# Patient Record
Sex: FEMALE | NOT HISPANIC OR LATINO | Employment: FULL TIME | URBAN - METROPOLITAN AREA
[De-identification: names, ages, dates, MRNs, and addresses within clinical notes are randomized per-mention and may not be internally consistent; named-entity substitution may affect disease eponyms.]

---

## 2024-06-12 ENCOUNTER — TRANSFERRED RECORDS (OUTPATIENT)
Dept: HEALTH INFORMATION MANAGEMENT | Facility: CLINIC | Age: 44
End: 2024-06-12

## 2024-06-27 ENCOUNTER — TRANSCRIBE ORDERS (OUTPATIENT)
Dept: OTHER | Age: 44
End: 2024-06-27

## 2024-06-27 DIAGNOSIS — H47.099 DISORDER OF OPTIC NERVE, UNSPECIFIED LATERALITY: Primary | ICD-10-CM

## 2024-08-06 NOTE — PROGRESS NOTES
1. Right superior oblique palsy, congenital:     We discussed options including observation, prisms, and strabismus surgery. We also discussed that strabismus surgery is not a guarantee to improve her neck arthritis or migraines even if elimination of diplopia is achieved.     Discussed risks, benefits, alternatives of elective strabismus surgery and she elected to pursue strabismus surgery.  Given she has tried nearly 10 different prism glasses I have low suspicion another prescription will work.     2. 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  Normal visual function in the left eye with 20/20 visual acuity and normal Ishihara color plates AND no afferent pupillary defect in the left eye.  May consider formal perimetry at future visit.    3. 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 Bee for review.     Leonora Mauricio is a pleasant 44 year old  female who presents to my neuro-ophthalmology clinic today having been referred by Dr. Bee for diplopia.    HPI:  Patient is a 44-year-old female with a chief complaint of binocular diplopia.     She has had diplopia for as long as she can remember. Did not see an eye doctor until college therefore did not use glasses, prisms, or surgery for the diplopia. She has compensated by adopting a left head tilt and suffered neck arthritis at age 37.     She has history of migraines a couple of times a month, with the migraine she has light sensitivity.  She also gets dizzy has nausea some balance issues with migraine.     Double vision happens in the afternoon when she is trying to read when her eyes are tired.  Has been treated with prism glasses. She's had many many prism glasses but never found a pair that have worked great as she will develop migraines with the prism glasses. Her migraines are mainly headache and nausea. The  second image is always up and to the right.     She been told she has an abnormal blood vessel in the left eye that was followed with serial scans/images.     She is here today to discuss options including surgery mainly to try to improve migraines and neck arthritis.     She thinks she is able to see 3D movies.     She also reports the left eye peripheral visual field has been documented as restricted but has not noticed a change. This is longstanding.     Independent historians:  Patient    Review of outside testing:  None    My interpretation performed today of outside testing:  None     Review of outside clinical notes:    6/27/24  Visit with Dr. Bee            Past medical history:  Migraines, anxiety, depression    Patient has a current medication list which includes the following prescription(s): citalopram.. Imitrex. List is confirmed today.    Family history / social history:  Patient's family history is not on file.     Exam:  VA 20/20 each eye, pupils equal and reactive no APD, intraocular pressure 12 right eye and 13 left eye, color 11/11 each eye, anterior segment exam unremarkable, fundus exam with peripapillary loop left eye.     Tests ordered and interpreted today:  Sensorimotor exam showed a relatively concomitant pattern right hypertropia in all gaze positions at distance. Minimal torsion on double walker roger. No significant right inferior oblique overaction.     We discussed in detail the risks, benefits, and alternatives of eye muscle correction surgery including the very rare risk of death or serious morbidity from a general anesthesia complication and the rare risk of severe vision loss in the operative eye(s) secondary to retinal detachment or endophthalmitis.  We discussed more likely sub-optimal outcomes including the unanticipated need for additional strabismus surgery.  Finally the patient was aware that prisms glasses may be required to optimize single vision following surgery.  Patient  aware that it is possible we will only reduce her dependency on prism but still need some for optimal fusion.    After a thorough discussion of these risks, the patient decided to proceed with strabismus surgery.  The surgical plan is as follows:     1. Bilateral eye muscle correction.    Fixed suture technique    Follow-up 1 week after strabismus surgery.    [facility]  Prism free glasses for adjustment: Non-adjustable    Patient wishes for surgery in January.       Vinod Kovacs MD  Resident Physician, PGY-3  Department of Ophthalmology    Precharting:  Johnnie Piedra MD   Fellow, Neuro-Ophthalmology    45 minutes were spent on the date of the encounter by me doing chart review, history and exam, documentation, and further activities as noted above    Complete documentation of historical and exam elements from today's encounter can be found in the full encounter summary report (not reduplicated in this progress note).  I personally obtained the chief complaint(s) and history of present illness.  I confirmed and edited as necessary the review of systems, past medical/surgical history, family history, social history, and examination findings as documented by others; and I examined the patient myself.  I personally reviewed the relevant tests, images, and reports as documented above.  I formulated and edited as necessary the assessment and plan and discussed the findings and management plan with the patient and family.  I personally reviewed the ophthalmic test(s) associated with this encounter, agree with the interpretation(s) as documented by the resident/fellow, and have edited the corresponding report(s) as necessary.     Bakari Rodrigez MD

## 2024-08-07 ENCOUNTER — OFFICE VISIT (OUTPATIENT)
Dept: OPHTHALMOLOGY | Facility: CLINIC | Age: 44
End: 2024-08-07
Attending: OPTOMETRIST
Payer: COMMERCIAL

## 2024-08-07 DIAGNOSIS — H53.10 SUBJECTIVE VISUAL DISTURBANCE: ICD-10-CM

## 2024-08-07 DIAGNOSIS — H50.21 HYPERTROPIA OF RIGHT EYE: ICD-10-CM

## 2024-08-07 DIAGNOSIS — H47.099 DISORDER OF OPTIC NERVE, UNSPECIFIED LATERALITY: ICD-10-CM

## 2024-08-07 DIAGNOSIS — H53.2 DOUBLE VISION: Primary | ICD-10-CM

## 2024-08-07 PROCEDURE — 99214 OFFICE O/P EST MOD 30 MIN: CPT | Performed by: OPHTHALMOLOGY

## 2024-08-07 PROCEDURE — 92060 SENSORIMOTOR EXAMINATION: CPT | Performed by: OPHTHALMOLOGY

## 2024-08-07 PROCEDURE — 92060 SENSORIMOTOR EXAMINATION: CPT

## 2024-08-07 PROCEDURE — 99204 OFFICE O/P NEW MOD 45 MIN: CPT | Performed by: OPHTHALMOLOGY

## 2024-08-07 PROCEDURE — 92060 SENSORIMOTOR EXAMINATION: CPT | Mod: 26 | Performed by: OPHTHALMOLOGY

## 2024-08-07 RX ORDER — CITALOPRAM HYDROBROMIDE 40 MG/1
40 TABLET ORAL EVERY MORNING
COMMUNITY

## 2024-08-07 ASSESSMENT — VISUAL ACUITY
METHOD: SNELLEN - LINEAR
OD_SC: 20/20
OS_SC: 20/20

## 2024-08-07 ASSESSMENT — REFRACTION_WEARINGRX
OD_VPRISM: 3.0
OD_HPRISM: 6.0
OS_CYLINDER: SPHERE
OD_SPHERE: -0.25
OS_VBASE: UP
OS_HBASE: IN
OD_HBASE: IN
OS_VPRISM: 1.0
OD_VBASE: DOWN
OS_SPHERE: -0.50
OD_CYLINDER: SPHERE
OS_HPRISM: 6.0

## 2024-08-07 ASSESSMENT — TONOMETRY
OD_IOP_MMHG: 12
IOP_METHOD: ICARE
OS_IOP_MMHG: 13

## 2024-08-07 ASSESSMENT — CONF VISUAL FIELD
OD_NORMAL: 1
OS_SUPERIOR_TEMPORAL_RESTRICTION: 3
OD_SUPERIOR_NASAL_RESTRICTION: 0
OD_INFERIOR_NASAL_RESTRICTION: 0
OD_SUPERIOR_TEMPORAL_RESTRICTION: 0
METHOD: COUNTING FINGERS
OD_INFERIOR_TEMPORAL_RESTRICTION: 0

## 2024-08-07 ASSESSMENT — EXTERNAL EXAM - LEFT EYE: OS_EXAM: NORMAL

## 2024-08-07 ASSESSMENT — EXTERNAL EXAM - RIGHT EYE: OD_EXAM: NORMAL

## 2024-08-07 ASSESSMENT — CUP TO DISC RATIO
OD_RATIO: 0.4
OS_RATIO: 0.4

## 2024-08-07 ASSESSMENT — SLIT LAMP EXAM - LIDS
COMMENTS: NORMAL
COMMENTS: NORMAL

## 2024-08-07 NOTE — NURSING NOTE
Chief Complaint(s) and History of Present Illness(es)       Strabismus Evaluation    In both eyes.  Associated symptoms include Negative for droopy eyelid, eye pain and blurred vision.  Pain was noted as 0/10. Additional comments: Leonora Mauricio is a 44 year old female sent for consultation by Dr. Ron Bee for strabismus evaluation.  Leonora reports has prism glasses that she does not wear often, appears to worsen her headaches.   Double vision is constant and mainly in the evenings.  Has been inconsistently wearing prism glasses for the last 10 years.  No childhood strabismus or amblyopia.  Pt was given an updated glasses Rx but to hold off on dispensing.  MICHI Gaytan 8/7/2024 9:00 AM

## 2024-08-07 NOTE — LETTER
2024    RE: Leonora Mauricio  : 1980  MRN: [id number]    Dear Dr. Bee    Thank you for referring your patient, Leonora Mauricio, to my neuro-ophthalmology clinic recently.  After a thorough neuro-ophthalmic history and examination, I came to the following conclusions:     1. Right superior oblique palsy, congenital:     We discussed options including observation, prisms, and strabismus surgery. We also discussed that strabismus surgery is not a guarantee to improve her neck arthritis or migraines even if elimination of diplopia is achieved.     Discussed risks, benefits, alternatives of elective strabismus surgery and she elected to pursue strabismus surgery.  Given she has tried nearly 10 different prism glasses I have low suspicion another prescription will work.     2. 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  Normal visual function in the left eye with 20/20 visual acuity and normal Ishihara color plates AND no afferent pupillary defect in the left eye.  May consider formal perimetry at future visit.    3. 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 Bee for review.     Leonora Mauricio is a pleasant 44 year old  female who presents to my neuro-ophthalmology clinic today having been referred by Dr. Bee for diplopia.    HPI:  Patient is a 44-year-old female with a chief complaint of binocular diplopia.     She has had diplopia for as long as she can remember. Did not see an eye doctor until college therefore did not use glasses, prisms, or surgery for the diplopia. She has compensated by adopting a left head tilt and suffered neck arthritis at age 37.     She has history of migraines a couple of times a month, with the migraine she has light sensitivity.  She also gets dizzy has nausea some balance issues with migraine.     Double  vision happens in the afternoon when she is trying to read when her eyes are tired.  Has been treated with prism glasses. She's had many many prism glasses but never found a pair that have worked great as she will develop migraines with the prism glasses. Her migraines are mainly headache and nausea. The second image is always up and to the right.     She been told she has an abnormal blood vessel in the left eye that was followed with serial scans/images.     She is here today to discuss options including surgery mainly to try to improve migraines and neck arthritis.     She thinks she is able to see 3D movies.     She also reports the left eye peripheral visual field has been documented as restricted but has not noticed a change. This is longstanding.     Independent historians:  Patient    Review of outside testing:  None    My interpretation performed today of outside testing:  None     Review of outside clinical notes:    6/27/24  Visit with Dr. Bee            Past medical history:  Migraines, anxiety, depression    Patient has a current medication list which includes the following prescription(s): citalopram.. Imitrex. List is confirmed today.    Family history / social history:  Patient's family history is not on file.     Exam:  VA 20/20 each eye, pupils equal and reactive no APD, intraocular pressure 12 right eye and 13 left eye, color 11/11 each eye, anterior segment exam unremarkable, fundus exam with peripapillary loop left eye.     Tests ordered and interpreted today:  Sensorimotor exam showed a relatively concomitant pattern right hypertropia in all gaze positions at distance. Minimal torsion on double walker roger. No significant right inferior oblique overaction.     We discussed in detail the risks, benefits, and alternatives of eye muscle correction surgery including the very rare risk of death or serious morbidity from a general anesthesia complication and the rare risk of severe vision loss in the  operative eye(s) secondary to retinal detachment or endophthalmitis.  We discussed more likely sub-optimal outcomes including the unanticipated need for additional strabismus surgery.  Finally the patient was aware that prisms glasses may be required to optimize single vision following surgery.  Patient aware that it is possible we will only reduce her dependency on prism but still need some for optimal fusion.    After a thorough discussion of these risks, the patient decided to proceed with strabismus surgery.  The surgical plan is as follows:     1. Bilateral eye muscle correction.    Fixed suture technique    Follow-up 1 week after strabismus surgery.    [facility] at: [facility]  Prism free glasses for adjustment: Non-adjustable    Patient wishes for surgery in January.    Again, thank you for trusting me with the care of your patient.  For further exam details, please feel free to contact our office for additional records.  If you wish to contact me regarding this patient please email me at [email or web address] or give my clinic a call to arrange a phone conversation.    Sincerely,    Bakari Rodrigez MD  , Neuro-Ophthalmology and Adult Strabismus Surgery  The Heidi Ramos Chair in Neuro-Ophthalmology  Department of Ophthalmology and Visual Neurosciences  [facility]    DX: right hypertropia, strabismus surgery, congenital cranial nerve 4 palsy

## 2024-12-15 ENCOUNTER — HEALTH MAINTENANCE LETTER (OUTPATIENT)
Age: 44
End: 2024-12-15

## 2024-12-23 RX ORDER — BUPROPION HYDROCHLORIDE 150 MG/1
150 TABLET ORAL EVERY MORNING
COMMUNITY
Start: 2024-12-05

## 2024-12-29 ENCOUNTER — ANESTHESIA EVENT (OUTPATIENT)
Dept: SURGERY | Facility: CLINIC | Age: 44
End: 2024-12-29
Payer: COMMERCIAL

## 2024-12-29 ASSESSMENT — LIFESTYLE VARIABLES: TOBACCO_USE: 0

## 2024-12-30 ENCOUNTER — HOSPITAL ENCOUNTER (OUTPATIENT)
Facility: CLINIC | Age: 44
End: 2024-12-30
Attending: OPHTHALMOLOGY | Admitting: OPHTHALMOLOGY
Payer: COMMERCIAL

## 2024-12-30 ENCOUNTER — ANESTHESIA (OUTPATIENT)
Dept: SURGERY | Facility: CLINIC | Age: 44
End: 2024-12-30
Payer: COMMERCIAL

## 2024-12-30 VITALS
DIASTOLIC BLOOD PRESSURE: 67 MMHG | TEMPERATURE: 97.7 F | HEIGHT: 66 IN | WEIGHT: 157.41 LBS | RESPIRATION RATE: 18 BRPM | SYSTOLIC BLOOD PRESSURE: 110 MMHG | HEART RATE: 70 BPM | BODY MASS INDEX: 25.3 KG/M2 | OXYGEN SATURATION: 97 %

## 2024-12-30 DIAGNOSIS — Z98.890 STATUS POST EYE SURGERY: Primary | ICD-10-CM

## 2024-12-30 PROCEDURE — 272N000001 HC OR GENERAL SUPPLY STERILE: Performed by: OPHTHALMOLOGY

## 2024-12-30 PROCEDURE — 360N000076 HC SURGERY LEVEL 3, PER MIN: Performed by: OPHTHALMOLOGY

## 2024-12-30 PROCEDURE — 250N000011 HC RX IP 250 OP 636: Performed by: STUDENT IN AN ORGANIZED HEALTH CARE EDUCATION/TRAINING PROGRAM

## 2024-12-30 PROCEDURE — 250N000009 HC RX 250: Performed by: STUDENT IN AN ORGANIZED HEALTH CARE EDUCATION/TRAINING PROGRAM

## 2024-12-30 PROCEDURE — 999N000141 HC STATISTIC PRE-PROCEDURE NURSING ASSESSMENT: Performed by: OPHTHALMOLOGY

## 2024-12-30 PROCEDURE — 258N000003 HC RX IP 258 OP 636: Performed by: NURSE ANESTHETIST, CERTIFIED REGISTERED

## 2024-12-30 PROCEDURE — 370N000017 HC ANESTHESIA TECHNICAL FEE, PER MIN: Performed by: OPHTHALMOLOGY

## 2024-12-30 PROCEDURE — 250N000009 HC RX 250: Performed by: NURSE ANESTHETIST, CERTIFIED REGISTERED

## 2024-12-30 PROCEDURE — 250N000025 HC SEVOFLURANE, PER MIN: Performed by: OPHTHALMOLOGY

## 2024-12-30 PROCEDURE — 258N000003 HC RX IP 258 OP 636: Performed by: STUDENT IN AN ORGANIZED HEALTH CARE EDUCATION/TRAINING PROGRAM

## 2024-12-30 PROCEDURE — 250N000013 HC RX MED GY IP 250 OP 250 PS 637: Performed by: STUDENT IN AN ORGANIZED HEALTH CARE EDUCATION/TRAINING PROGRAM

## 2024-12-30 PROCEDURE — 710N000012 HC RECOVERY PHASE 2, PER MINUTE: Performed by: OPHTHALMOLOGY

## 2024-12-30 PROCEDURE — 67311 REVISE EYE MUSCLE: CPT | Mod: LT | Performed by: OPHTHALMOLOGY

## 2024-12-30 PROCEDURE — 250N000009 HC RX 250: Performed by: OPHTHALMOLOGY

## 2024-12-30 PROCEDURE — 250N000011 HC RX IP 250 OP 636: Performed by: NURSE ANESTHETIST, CERTIFIED REGISTERED

## 2024-12-30 PROCEDURE — 710N000010 HC RECOVERY PHASE 1, LEVEL 2, PER MIN: Performed by: OPHTHALMOLOGY

## 2024-12-30 PROCEDURE — 67314 REVISE EYE MUSCLE: CPT | Mod: RT | Performed by: OPHTHALMOLOGY

## 2024-12-30 RX ORDER — BALANCED SALT SOLUTION 6.4; .75; .48; .3; 3.9; 1.7 MG/ML; MG/ML; MG/ML; MG/ML; MG/ML; MG/ML
SOLUTION OPHTHALMIC PRN
Status: DISCONTINUED | OUTPATIENT
Start: 2024-12-30 | End: 2024-12-30 | Stop reason: HOSPADM

## 2024-12-30 RX ORDER — ACETAMINOPHEN 325 MG/1
975 TABLET ORAL ONCE
Status: DISCONTINUED | OUTPATIENT
Start: 2024-12-30 | End: 2025-01-06 | Stop reason: HOSPADM

## 2024-12-30 RX ORDER — HYDROMORPHONE HYDROCHLORIDE 1 MG/ML
0.4 INJECTION, SOLUTION INTRAMUSCULAR; INTRAVENOUS; SUBCUTANEOUS EVERY 5 MIN PRN
Status: DISCONTINUED | OUTPATIENT
Start: 2024-12-30 | End: 2025-01-06 | Stop reason: HOSPADM

## 2024-12-30 RX ORDER — ONDANSETRON 2 MG/ML
4 INJECTION INTRAMUSCULAR; INTRAVENOUS EVERY 30 MIN PRN
Status: DISCONTINUED | OUTPATIENT
Start: 2024-12-30 | End: 2025-01-06 | Stop reason: HOSPADM

## 2024-12-30 RX ORDER — NALOXONE HYDROCHLORIDE 0.4 MG/ML
0.1 INJECTION, SOLUTION INTRAMUSCULAR; INTRAVENOUS; SUBCUTANEOUS
Status: DISCONTINUED | OUTPATIENT
Start: 2024-12-30 | End: 2025-01-06 | Stop reason: HOSPADM

## 2024-12-30 RX ORDER — LIDOCAINE HYDROCHLORIDE 20 MG/ML
INJECTION, SOLUTION INFILTRATION; PERINEURAL PRN
Status: DISCONTINUED | OUTPATIENT
Start: 2024-12-30 | End: 2024-12-30

## 2024-12-30 RX ORDER — OXYMETAZOLINE HYDROCHLORIDE 0.05 G/100ML
SPRAY NASAL PRN
Status: DISCONTINUED | OUTPATIENT
Start: 2024-12-30 | End: 2024-12-30 | Stop reason: HOSPADM

## 2024-12-30 RX ORDER — ONDANSETRON 2 MG/ML
INJECTION INTRAMUSCULAR; INTRAVENOUS PRN
Status: DISCONTINUED | OUTPATIENT
Start: 2024-12-30 | End: 2024-12-30

## 2024-12-30 RX ORDER — KETOROLAC TROMETHAMINE 30 MG/ML
INJECTION, SOLUTION INTRAMUSCULAR; INTRAVENOUS PRN
Status: DISCONTINUED | OUTPATIENT
Start: 2024-12-30 | End: 2024-12-30

## 2024-12-30 RX ORDER — DEXAMETHASONE SODIUM PHOSPHATE 4 MG/ML
INJECTION, SOLUTION INTRA-ARTICULAR; INTRALESIONAL; INTRAMUSCULAR; INTRAVENOUS; SOFT TISSUE PRN
Status: DISCONTINUED | OUTPATIENT
Start: 2024-12-30 | End: 2024-12-30

## 2024-12-30 RX ORDER — ONDANSETRON 4 MG/1
4 TABLET, ORALLY DISINTEGRATING ORAL EVERY 30 MIN PRN
Status: DISCONTINUED | OUTPATIENT
Start: 2024-12-30 | End: 2025-01-06 | Stop reason: HOSPADM

## 2024-12-30 RX ORDER — PREDNISOLONE ACETATE 10 MG/ML
SUSPENSION/ DROPS OPHTHALMIC
Qty: 10 ML | Refills: 0 | Status: SHIPPED | OUTPATIENT
Start: 2024-12-30 | End: 2024-12-30

## 2024-12-30 RX ORDER — HYDROMORPHONE HYDROCHLORIDE 1 MG/ML
0.2 INJECTION, SOLUTION INTRAMUSCULAR; INTRAVENOUS; SUBCUTANEOUS EVERY 5 MIN PRN
Status: DISCONTINUED | OUTPATIENT
Start: 2024-12-30 | End: 2025-01-06 | Stop reason: HOSPADM

## 2024-12-30 RX ORDER — ACETAMINOPHEN 325 MG/1
975 TABLET ORAL ONCE
Status: COMPLETED | OUTPATIENT
Start: 2024-12-30 | End: 2024-12-30

## 2024-12-30 RX ORDER — SODIUM CHLORIDE, SODIUM LACTATE, POTASSIUM CHLORIDE, CALCIUM CHLORIDE 600; 310; 30; 20 MG/100ML; MG/100ML; MG/100ML; MG/100ML
INJECTION, SOLUTION INTRAVENOUS CONTINUOUS PRN
Status: DISCONTINUED | OUTPATIENT
Start: 2024-12-30 | End: 2024-12-30

## 2024-12-30 RX ORDER — FENTANYL CITRATE 50 UG/ML
50 INJECTION, SOLUTION INTRAMUSCULAR; INTRAVENOUS EVERY 5 MIN PRN
Status: DISCONTINUED | OUTPATIENT
Start: 2024-12-30 | End: 2025-01-06 | Stop reason: HOSPADM

## 2024-12-30 RX ORDER — FENTANYL CITRATE 50 UG/ML
INJECTION, SOLUTION INTRAMUSCULAR; INTRAVENOUS PRN
Status: DISCONTINUED | OUTPATIENT
Start: 2024-12-30 | End: 2024-12-30

## 2024-12-30 RX ORDER — PROPOFOL 10 MG/ML
INJECTION, EMULSION INTRAVENOUS CONTINUOUS PRN
Status: DISCONTINUED | OUTPATIENT
Start: 2024-12-30 | End: 2024-12-30

## 2024-12-30 RX ORDER — FENTANYL CITRATE 50 UG/ML
25 INJECTION, SOLUTION INTRAMUSCULAR; INTRAVENOUS EVERY 5 MIN PRN
Status: DISCONTINUED | OUTPATIENT
Start: 2024-12-30 | End: 2025-01-06 | Stop reason: HOSPADM

## 2024-12-30 RX ORDER — DIPHENHYDRAMINE HYDROCHLORIDE 50 MG/ML
25 INJECTION INTRAMUSCULAR; INTRAVENOUS EVERY 6 HOURS PRN
Status: DISCONTINUED | OUTPATIENT
Start: 2024-12-30 | End: 2025-01-06 | Stop reason: HOSPADM

## 2024-12-30 RX ORDER — DIPHENHYDRAMINE HCL 25 MG
25 CAPSULE ORAL EVERY 6 HOURS PRN
Status: DISCONTINUED | OUTPATIENT
Start: 2024-12-30 | End: 2025-01-06 | Stop reason: HOSPADM

## 2024-12-30 RX ORDER — GABAPENTIN 100 MG/1
300 CAPSULE ORAL
Status: COMPLETED | OUTPATIENT
Start: 2024-12-30 | End: 2024-12-30

## 2024-12-30 RX ORDER — PROPOFOL 10 MG/ML
INJECTION, EMULSION INTRAVENOUS PRN
Status: DISCONTINUED | OUTPATIENT
Start: 2024-12-30 | End: 2024-12-30

## 2024-12-30 RX ORDER — SODIUM CHLORIDE, SODIUM LACTATE, POTASSIUM CHLORIDE, CALCIUM CHLORIDE 600; 310; 30; 20 MG/100ML; MG/100ML; MG/100ML; MG/100ML
INJECTION, SOLUTION INTRAVENOUS CONTINUOUS
Status: DISCONTINUED | OUTPATIENT
Start: 2024-12-30 | End: 2024-12-30 | Stop reason: HOSPADM

## 2024-12-30 RX ORDER — SCOPOLAMINE 1 MG/3D
1 PATCH, EXTENDED RELEASE TRANSDERMAL ONCE
Status: COMPLETED | OUTPATIENT
Start: 2024-12-30 | End: 2024-12-31

## 2024-12-30 RX ORDER — PREDNISOLONE ACETATE 10 MG/ML
SUSPENSION/ DROPS OPHTHALMIC
Qty: 10 ML | Refills: 0 | Status: SHIPPED | OUTPATIENT
Start: 2025-01-07

## 2024-12-30 RX ORDER — HALOPERIDOL 5 MG/ML
1 INJECTION INTRAMUSCULAR
Status: COMPLETED | OUTPATIENT
Start: 2024-12-30 | End: 2024-12-30

## 2024-12-30 RX ORDER — SODIUM CHLORIDE, SODIUM LACTATE, POTASSIUM CHLORIDE, CALCIUM CHLORIDE 600; 310; 30; 20 MG/100ML; MG/100ML; MG/100ML; MG/100ML
INJECTION, SOLUTION INTRAVENOUS CONTINUOUS
Status: DISCONTINUED | OUTPATIENT
Start: 2024-12-30 | End: 2025-01-06 | Stop reason: HOSPADM

## 2024-12-30 RX ORDER — LIDOCAINE 40 MG/G
CREAM TOPICAL
Status: DISCONTINUED | OUTPATIENT
Start: 2024-12-30 | End: 2024-12-30 | Stop reason: HOSPADM

## 2024-12-30 RX ADMIN — DEXAMETHASONE SODIUM PHOSPHATE 4 MG: 4 INJECTION, SOLUTION INTRAMUSCULAR; INTRAVENOUS at 08:16

## 2024-12-30 RX ADMIN — HALOPERIDOL LACTATE 1 MG: 5 INJECTION, SOLUTION INTRAMUSCULAR at 10:27

## 2024-12-30 RX ADMIN — ONDANSETRON 4 MG: 2 INJECTION INTRAMUSCULAR; INTRAVENOUS at 09:48

## 2024-12-30 RX ADMIN — KETOROLAC TROMETHAMINE 30 MG: 30 INJECTION, SOLUTION INTRAMUSCULAR at 09:04

## 2024-12-30 RX ADMIN — GABAPENTIN 300 MG: 300 CAPSULE ORAL at 07:56

## 2024-12-30 RX ADMIN — SODIUM CHLORIDE, POTASSIUM CHLORIDE, SODIUM LACTATE AND CALCIUM CHLORIDE: 600; 310; 30; 20 INJECTION, SOLUTION INTRAVENOUS at 08:00

## 2024-12-30 RX ADMIN — HYDROMORPHONE HYDROCHLORIDE 0.2 MG: 1 INJECTION, SOLUTION INTRAMUSCULAR; INTRAVENOUS; SUBCUTANEOUS at 09:44

## 2024-12-30 RX ADMIN — ONDANSETRON 4 MG: 2 INJECTION INTRAMUSCULAR; INTRAVENOUS at 09:00

## 2024-12-30 RX ADMIN — SODIUM CHLORIDE, POTASSIUM CHLORIDE, SODIUM LACTATE AND CALCIUM CHLORIDE: 600; 310; 30; 20 INJECTION, SOLUTION INTRAVENOUS at 07:57

## 2024-12-30 RX ADMIN — PROPOFOL 50 MCG/KG/MIN: 10 INJECTION, EMULSION INTRAVENOUS at 08:16

## 2024-12-30 RX ADMIN — HYDROMORPHONE HYDROCHLORIDE 0.2 MG: 1 INJECTION, SOLUTION INTRAMUSCULAR; INTRAVENOUS; SUBCUTANEOUS at 09:54

## 2024-12-30 RX ADMIN — DEXMEDETOMIDINE HYDROCHLORIDE 8 MCG: 100 INJECTION, SOLUTION INTRAVENOUS at 08:22

## 2024-12-30 RX ADMIN — MIDAZOLAM 2 MG: 1 INJECTION INTRAMUSCULAR; INTRAVENOUS at 08:00

## 2024-12-30 RX ADMIN — SCOPOLAMINE 1 PATCH: 1.5 PATCH, EXTENDED RELEASE TRANSDERMAL at 07:56

## 2024-12-30 RX ADMIN — ACETAMINOPHEN 975 MG: 325 TABLET, FILM COATED ORAL at 07:56

## 2024-12-30 RX ADMIN — FENTANYL CITRATE 100 MCG: 50 INJECTION INTRAMUSCULAR; INTRAVENOUS at 08:06

## 2024-12-30 RX ADMIN — LIDOCAINE HYDROCHLORIDE 100 MG: 20 INJECTION, SOLUTION INFILTRATION; PERINEURAL at 08:06

## 2024-12-30 RX ADMIN — PROPOFOL 150 MG: 10 INJECTION, EMULSION INTRAVENOUS at 08:06

## 2024-12-30 ASSESSMENT — ACTIVITIES OF DAILY LIVING (ADL)
ADLS_ACUITY_SCORE: 32

## 2024-12-30 NOTE — ANESTHESIA CARE TRANSFER NOTE
Patient: Leonora Mauricio    Procedure: Procedure(s):  Bilateral Eye Muscle Correction       Diagnosis: Double vision [H53.2]  Diagnosis Additional Information: No value filed.    Anesthesia Type:   General     Note:    Oropharynx: oropharynx clear of all foreign objects, spontaneously breathing and oral airway in place  Level of Consciousness: drowsy  Oxygen Supplementation: face mask  Level of Supplemental Oxygen (L/min / FiO2): 6  Independent Airway: airway patency satisfactory and stable  Dentition: dentition unchanged  Vital Signs Stable: post-procedure vital signs reviewed and stable  Report to RN Given: handoff report given  Patient transferred to: PACU    Handoff Report: Identifed the Patient, Identified the Reponsible Provider, Reviewed the pertinent medical history, Discussed the surgical course, Reviewed Intra-OP anesthesia mangement and issues during anesthesia, Set expectations for post-procedure period and Allowed opportunity for questions and acknowledgement of understanding      Vitals:  Vitals Value Taken Time   BP     Temp     Pulse     Resp     SpO2         Electronically Signed By: BRANDON Bajwa CRNA  December 30, 2024  9:17 AM

## 2024-12-30 NOTE — DISCHARGE INSTRUCTIONS
To contact a doctor, call Dr. Lauren office [phone] OR  ' [phone] and ask for the Resident On Call for          ophthalmology (answered 24 hours a day)  '   Emergency Department:  [facility]'s Emergency Department:  [phone]       Please refer to your doctor's (or  bottle) recommendations for dosing per weight and frequency  of Tylenol (acetaminophen) and ibuprofen. Your child's weight today was Weight: 71.4 kg (157 lb 6.5 oz)    Last dose of Tylenol given at 7:56 AM . Next dose due at 1:56 PM .  Last dose of NSAID (toradol or ibuprofen) given at 9:04 AM . Next dose due at  3:04 PM .    Continue to alternate Tylenol and ibuprofen every 3 hours as needed for comfort.

## 2024-12-30 NOTE — ANESTHESIA PROCEDURE NOTES
Airway       Patient location during procedure: OR  Staff -        Anesthesiologist:  Saundra Wynn MD       CRNA: Sagrario Estrada APRN CRNA       Performed By: CRNAIndications and Patient Condition       Indications for airway management: madhuri-procedural       Induction type:intravenous       Mask difficulty assessment: 1 - vent by mask    Final Airway Details       Final airway type: supraglottic airway    Supraglottic Airway Details        Type: LMA       Brand: Air-Q       LMA size: 4    Post intubation assessment        Placement verified by: capnometry, equal breath sounds and chest rise        Number of attempts at approach: 1       Number of other approaches attempted: 0       Secured with: tape       Ease of procedure: easy       Dentition: Intact and Unchanged

## 2024-12-30 NOTE — ANESTHESIA PREPROCEDURE EVALUATION
Anesthesia Pre-Procedure Evaluation    Patient: Leonora Mauricio   MRN: [id number] : 1980        Procedure : Procedure(s):  Bilateral Eye Muscle Correction          No past medical history on file.   No past surgical history on file.   No Known Allergies   Social History     Tobacco Use    Smoking status: Not on file    Smokeless tobacco: Not on file   Substance Use Topics    Alcohol use: Not on file      Wt Readings from Last 1 Encounters:   No data found for Wt        Anesthesia Evaluation   Pt has had prior anesthetic.     No history of anesthetic complications       ROS/MED HX  ENT/Pulmonary: Comment: Strabismus and reported double vision  -- On 2024: scheduled for bilateral strabismus correction by    (-) tobacco use and asthma   Neurologic: Comment: On Sumatriptan    (+)      migraines,                          Cardiovascular: Comment: Reported during most recent H&P: /64 (Cuff Site: Right Arm, Sitting, Cuff Size: Adult Large)    (-) hypertension and irregular heartbeat/palpitations   METS/Exercise Tolerance:     Hematologic:  - neg hematologic  ROS     Musculoskeletal: Comment: Neck pain and low back pain with no reported radicular symptoms.   On conservative management, Takes Flexeril as needed.       GI/Hepatic:       Renal/Genitourinary: Comment: Labs : creat 0.72, eGFR 107 - neg Renal ROS     Endo: Comment: Per H&P :  Ht 1.676 m, Wt 71.9 kg, BMI 25 - neg endo ROS     Psychiatric/Substance Use: Comment: ADHD and Anxiety/KONSTANTIN  On Wellbutrin       Infectious Disease:  - neg infectious disease ROS     Malignancy:  - neg malignancy ROS     Other: Comment:   # Endorsing possible menopause symptoms in recent visit to primary care provider.            Physical Exam    Airway  airway exam normal      Mallampati: I   TM distance: > 3 FB   Neck ROM: full   Mouth opening: > 3 cm    Respiratory Devices and Support         Dental       (+) Completely normal  "teeth      Cardiovascular   cardiovascular exam normal          Pulmonary   pulmonary exam normal                OUTSIDE LABS:  CBC: No results found for: \"WBC\", \"HGB\", \"HCT\", \"PLT\"  BMP: No results found for: \"NA\", \"POTASSIUM\", \"CHLORIDE\", \"CO2\", \"BUN\", \"CR\", \"GLC\"  COAGS: No results found for: \"PTT\", \"INR\", \"FIBR\"  POC: No results found for: \"BGM\", \"HCG\", \"HCGS\"  HEPATIC: No results found for: \"ALBUMIN\", \"PROTTOTAL\", \"ALT\", \"AST\", \"GGT\", \"ALKPHOS\", \"BILITOTAL\", \"BILIDIRECT\", \"PREET\"  OTHER: No results found for: \"PH\", \"LACT\", \"A1C\", \"EZ\", \"PHOS\", \"MAG\", \"LIPASE\", \"AMYLASE\", \"TSH\", \"T4\", \"T3\", \"CRP\", \"SED\"    Anesthesia Plan    ASA Status:  2    NPO Status:  NPO Appropriate    Anesthesia Type: General.     - Airway: LMA              Consents    Anesthesia Plan(s) and associated risks, benefits, and realistic alternatives discussed. Questions answered and patient/representative(s) expressed understanding.     - Discussed:     - Discussed with:  Patient      - Extended Intubation/Ventilatory Support Discussed: No.      - Patient is DNR/DNI Status: No     Use of blood products discussed: No .     Postoperative Care    Pain management: Oral pain medications, IV analgesics, Multi-modal analgesia.   PONV prophylaxis: Ondansetron (or other 5HT-3), Dexamethasone or Solumedrol, Scopolamine patch     Comments:               Saundra Carver MD    I have reviewed the pertinent notes and labs in the chart from the past 30 days and (re)examined the patient.  Any updates or changes from those notes are reflected in this note.                                   "

## 2024-12-30 NOTE — OP NOTE
"ATTENDING SURGEON:  Bakari Rodrigez MD    DATE OF PROCEDURE:  December 30, 2024    FIRST SURGICAL ASSISTANT:  Johnnie Martinez MD: Neuro-ophthalmology fellow     SECOND SURGICAL ASSISTANT:   Andrew Fields MD (PGY 3 Ophthalmology Resident)     ANESTHESIA:  General anesthesia    PREOPERATIVE DIAGNOSIS (ES):  Right hypertropia   Alternating exotropia     POSTOPERATIVE DIAGNOSIS (ES):  Same    NAME OF OPERATION:  Right superior rectus recession 3.5 mm  Left lateral rectus recession 4.5 mm    INDICATIONS FOR PROCEDURE:    The patient is a pleasant 44 year old female with a past ocular history of longstanding strabismus that had been decompensating and causing binocular diplopia for many years.  She had trialed ground in prism glasses but was intolerant of them as they caused headaches and did not resolve her diplopia. She was eager for strabismus surgery to reduce her dependency on ground in prism glasses and allow more stable single binocular vision.    I warned the patient about the risks, benefits, and alternatives of eye muscle surgery including a relatively small risk for severe infection, retinal detachment, and permanent vision loss of the eye.  I also discussed the possibility of postoperative double vision.  I discussed the possibility that due to postoperative double vision or a postoperative recurrent strabismus, the patient may require additional strabismus procedures in the future.  Understanding these risks, the patient decided to proceed with strabismus surgery.      DESCRIPTION OF PROCEDURE:    After the risks, benefits, and alternatives of eye muscle surgery were discussed with the patient and the patient's questions were answered both in clinic and on the day of surgery, written informed consent was obtained and witnessed in the preoperative holding area on the day of surgery.  The word \"yes\" was written over both eyes indicating that the patient consented to eye muscle correction in both eyes.  An " intravenous line was placed and light intravenous sedation was given.  The patient was transported to the operating room where after appropriate monitors were placed, the patient underwent induction of general anesthesia without complication.      Both eyes were prepped and draped in the usual sterile fashion for ophthalmic surgery and a lid speculum was placed in the right eye.  Forced duction testing in this eye revealed no restriction.  A trapezoidal superior conjunctival flap was created using conjunctival forceps and Tere scissors.  This was reflected backwards to expose the right superior rectus muscle which was isolated using a [place] muscle hook.  The muscle was freed from Tenon's capsule with blunt dissection using a Tere scissors and cotton swab.  A double armed 6-0 Vicryl suture was then woven across the width of the muscle near it's insertion of the globe and tied to the edges.  The muscle was disinserted from the globe using Tere scissors.  Both arms of the 6-0 Vicryl suture were then passed partial thickness through the sclera at a point measured to be 3.5 mm posterior to the physiologic muscle insertion using a caliper.  At this point, the ends of the suture were tied together.  The needles were cut off and the ends were cut short.  The conjunctival flap was then re-opposed in the surgical bed and held in place using two 6-0 plain gut sutures.  The lid speculum was removed from the operative eye.     A lid speculum was placed in the left eye.  Forced duction testing in this eye revealed no restriction.  A trapezoidal temporal conjunctival flap was created using conjunctival forceps and Tere scissors.  This was reflected backwards to expose the left lateral rectus muscle which was isolated using a [place] muscle hook.  The muscle was freed from Tenon's capsule with blunt dissection using a Tere scissors and cotton swab.  A double armed 6-0 Vicryl suture was then woven across the  width of the muscle near it's insertion of the globe and tied to the edges.  The muscle was disinserted from the globe using Tere scissors.  Both arms of the 6-0 Vicryl suture were then passed partial thickness through the sclera at a point measured to be 4.5 mm posterior to the physiologic muscle insertion using a caliper.  At this point, the ends of the suture were tied together.  The needles were cut off and the ends were cut short.  The conjunctival flap was then re-opposed in the surgical bed and held in place using two 6-0 plain gut sutures.  The lid speculum was removed from the operative eye.     Maxitrol ointment was placed in both eyes.  The patient was awakened from general anesthesia without complication and discharged to the recovery room in stable condition.       SPECIMENS REMOVED:  None.      ESTIMATED BLOOD LOSS:  1 mL or less.    INTRAOPERATIVE FLUIDS:  As per anesthesia records.      SPONGE/INSTRUMENT/NEEDLE COUNTS:  All sponge, instrument, and needle counts were correct times two.    CONDITION ON DISCHARGE FROM OPERATING ROOM:  Stable.      COMPLICATIONS:  None.     I was present for the entire procedure

## 2024-12-30 NOTE — ANESTHESIA POSTPROCEDURE EVALUATION
Patient: Leonora Mauricio    Procedure: Procedure(s):  Bilateral Eye Muscle Correction       Anesthesia Type:  General    Note:  Disposition: Outpatient   Postop Pain Control: Uneventful            Sign Out: Well controlled pain   PONV: No   Neuro/Psych: Uneventful            Sign Out: Acceptable/Baseline neuro status   Airway/Respiratory: Uneventful            Sign Out: Acceptable/Baseline resp. status   CV/Hemodynamics: Uneventful            Sign Out: Acceptable CV status; No obvious hypovolemia; No obvious fluid overload   Other NRE: NONE   DID A NON-ROUTINE EVENT OCCUR? No           Last vitals:  Vitals Value Taken Time   /78 12/30/24 1000   Temp     Pulse 71 12/30/24 1008   Resp 11 12/30/24 1008   SpO2 97 % 12/30/24 1008   Vitals shown include unfiled device data.    Electronically Signed By: Saundra Carver MD  December 30, 2024  10:08 AM

## 2024-12-30 NOTE — BRIEF OP NOTE
[facility]    Brief Operative Note     Pre-operative diagnosis: Strabismus [H50.9]  Post-operative diagnosis Same as pre-operative diagnosis    Procedure(s):  Bilateral Eye Muscle Correction    Surgeons and Role:     * Bakari Rodrigez MD - Primary     * Andrew Fields MD - Resident - Assisting     * Johnnie Guerra MD - Fellow - Assisting    Anesthesia: General   Estimated Blood Loss: Less than 1 ml    Drains:   None  Specimens:  * No specimens in log *  Findings:   See full operative report.  Complications:             None.  Implants:  * No implants in log *

## 2024-12-31 ASSESSMENT — ACTIVITIES OF DAILY LIVING (ADL)
ADLS_ACUITY_SCORE: 32

## 2025-01-01 ASSESSMENT — ACTIVITIES OF DAILY LIVING (ADL)
ADLS_ACUITY_SCORE: 32

## 2025-01-02 ASSESSMENT — ACTIVITIES OF DAILY LIVING (ADL)
ADLS_ACUITY_SCORE: 32

## 2025-01-03 ASSESSMENT — ACTIVITIES OF DAILY LIVING (ADL)
ADLS_ACUITY_SCORE: 32

## 2025-01-04 ASSESSMENT — ACTIVITIES OF DAILY LIVING (ADL)
ADLS_ACUITY_SCORE: 32

## 2025-01-05 ASSESSMENT — ACTIVITIES OF DAILY LIVING (ADL)
ADLS_ACUITY_SCORE: 32

## 2025-02-12 ENCOUNTER — OFFICE VISIT (OUTPATIENT)
Dept: OPHTHALMOLOGY | Facility: CLINIC | Age: 45
End: 2025-02-12
Attending: OPHTHALMOLOGY
Payer: COMMERCIAL

## 2025-02-12 DIAGNOSIS — H50.30 INTERMITTENT EXOTROPIA: Primary | ICD-10-CM

## 2025-02-12 PROCEDURE — 99024 POSTOP FOLLOW-UP VISIT: CPT | Performed by: OPHTHALMOLOGY

## 2025-02-12 PROCEDURE — 99214 OFFICE O/P EST MOD 30 MIN: CPT | Performed by: OPHTHALMOLOGY

## 2025-02-12 ASSESSMENT — EXTERNAL EXAM - RIGHT EYE: OD_EXAM: NORMAL

## 2025-02-12 ASSESSMENT — TONOMETRY
OD_IOP_MMHG: 19
OS_IOP_MMHG: 19
IOP_METHOD: ICARE

## 2025-02-12 ASSESSMENT — CONF VISUAL FIELD
OD_NORMAL: 1
OD_INFERIOR_NASAL_RESTRICTION: 0
OS_INFERIOR_TEMPORAL_RESTRICTION: 3
OD_INFERIOR_TEMPORAL_RESTRICTION: 0
OD_SUPERIOR_NASAL_RESTRICTION: 0
OS_SUPERIOR_TEMPORAL_RESTRICTION: 3
OD_SUPERIOR_TEMPORAL_RESTRICTION: 0
METHOD: COUNTING FINGERS

## 2025-02-12 ASSESSMENT — SLIT LAMP EXAM - LIDS
COMMENTS: NORMAL
COMMENTS: NORMAL

## 2025-02-12 ASSESSMENT — EXTERNAL EXAM - LEFT EYE: OS_EXAM: NORMAL

## 2025-02-12 ASSESSMENT — VISUAL ACUITY
OD_SC: 20/20
OS_SC+: -2
OS_SC: 20/20
METHOD: SNELLEN - LINEAR

## 2025-02-12 ASSESSMENT — CUP TO DISC RATIO
OD_RATIO: 0.4
OS_RATIO: 0.4

## 2025-02-12 NOTE — PROGRESS NOTES
1. 6 week post-op status post strabismus surgery:  Check manifest refraction next visit if patient symptomatic from blurred vision in the right eye.    -Surgery:    PREOPERATIVE DIAGNOSIS (ES):  Right hypertropia   Alternating exotropia      POSTOPERATIVE DIAGNOSIS (ES):  Same     NAME OF OPERATION:  Right superior rectus recession 3.5 mm  Left lateral rectus recession 4.5 mm     - Alignment: Excellent. Resolution of hypertropia  - Diplopia: no binocular diplopia. Patient today has subtle monocular shadowing in the right eye despite 20/20 vision. She is bothered by her shadowing in the right eye  - Healed up well  - Maxitrol ophthalmic ointment: stopped, prednisolone acetate 1% drops stopped    Return to clinic in 3 months or sooner as needed.    Check manifest refraction next visit if patient symptomatic from blurred vision in the right eye.    Interval HPI, exam, and data since last visit:  Patient was last seen on 12/30/24 for her strabismus surgery with right superior rectus recession (3.5mm) and left lateral rectus recession (4.5mm).     Since her surgery, patient experienced nausea and migraines triggered by double vision, especially worsened when trying to focus her vision on things such as video calls. She was seen by a neurologist for migraines, which she reported having 10-12 migraine days per month. She had migraines since her teenage years but they have worsened around the age of 41. Neurologist wanted to get MR Brain, which has not been completed yet. Also prescribed sumatriptan for abortive therapy, compazine for nausea. Robaxin for neck pain, and vitamin D.     Today, patient reports that she has subtle strain, but no pain or irritation. Double vision has greatly improved but is noticeable at night time and in a window of her vision on the left hand side. She feels like the quality of the vision is worse and feels like things do not look crisp at distance. She is off all drops and ointment. She  uses her sumatriptan twice a week (her max allowed), compazine about 3 times a week for nausea, and robaxin 1-2x/week.     Neurologist is Mary Caraballo NP- [facility] 1/31/25    -Will plan for MRI brain to rule out any structural abnormality such as tumor or mass.   -For prophylactic therapy will start Migrelief daily. Since her surgery, she has had an increase in headache frequency. May consider Cefaly device.   -For abortive therapy will increase Sumatriptan from 25 mg to 50 mg PRN daily. Do not take more than 2 pills per week.   -Nausea: Will start Compazine 5 mg PRN. Zofran made her constipated.   -Neck pain: Will start Robaxin 500 mg PRN. Flexeril was not helpful.   -Low Vitamin D level: Start Vitamin D 2,000 iu daily.  -Recommended life style changes of regular meals, scheduled sleep and regular exercise of 40  minutes 3 times a week and staying well hydrated.  -Follow up in 3 months.    Thank you very much Dr. Prisca Bang for requesting your neurology consultation. A copy will be sent to you.          Complete documentation of historical and exam elements from today's encounter can be found in the full encounter summary report (not reduplicated in this progress note).  I personally obtained the chief complaint(s) and history of present illness.  I confirmed and edited as necessary the review of systems, past medical/surgical history, family history, social history, and examination findings as documented by others; and I examined the patient myself.  I personally reviewed the relevant tests, images, and reports as documented above.  I formulated and edited as necessary the assessment and plan and discussed the findings and management plan with the patient and family   Bakari Rodrigez MD

## 2025-02-12 NOTE — PROGRESS NOTES
1. 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 Small alternating exotropia 4D at distance and near in primary. Patient noting improvement in diplopia but has had severe nausea and migraines immediately following the surgery, but these symptoms are greatly improved although not entirely resolved.     2. 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  Normal visual function in the left eye with 20/20 visual acuity and normal Ishihara color plates AND no afferent pupillary defect in the left eye.  May consider formal perimetry at future visit.    3. 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 Bee for review.     Leonora Mauricio is a pleasant 44 year old  female who presents to my neuro-ophthalmology clinic today having been referred by Dr. Bee for diplopia.    Interval HPI, exam, and data since last visit:  Patient was last seen on 12/30/24 for her strabismus surgery with right superior rectus recession (3.5mm) and left lateral rectus recession (4.5mm).     Since her surgery, patient experienced nausea and migraines triggered by double vision, especially worsened when trying to focus her vision on things such as video calls. She was seen by a neurologist for migraines, which she reported having 10-12 migraine days per month. She had migraines since her teenage years but they have worsened around the age of 41. Neurologist wanted to get MR Brain, which has not been completed yet. Also prescribed sumatriptan for abortive therapy, compazine for nausea. Robaxin for neck pain, and vitamin D.     Today, patient reports that she has subtle strain, but no pain or irritation. Double vision has greatly improved but is noticeable at night time and in a window  of her vision on the left hand side. She feels like the quality of the vision is worse and feels like things do not look crisp at distance. She is off all drops and ointment. She uses her sumatriptan twice a week (her max allowed), compazine about 3 times a week for nausea, and robaxin 1-2x/week.     Exam today:  Visual acuity 20/20 right eye 20/20 left eye.  Color vision is 11/11.  Pupils: no APD.  Intraocular pressure 19 right eye and 19 left eye.  Anterior segment exam with appropriate healing of incision sites both eyes.  Fundus exam normal and stable.  Strabismus exam with X(T) 4 at distance and near.    Tests ordered and interpreted today:    Sensorimotor exam:  X(T) 4 in primary at distance and near.          HPI from initial presentation  Patient is a 44-year-old female with a chief complaint of binocular diplopia.     She has had diplopia for as long as she can remember. Did not see an eye doctor until college therefore did not use glasses, prisms, or surgery for the diplopia. She has compensated by adopting a left head tilt and suffered neck arthritis at age 37.     She has history of migraines a couple of times a month, with the migraine she has light sensitivity.  She also gets dizzy has nausea some balance issues with migraine.     Double vision happens in the afternoon when she is trying to read when her eyes are tired.  Has been treated with prism glasses. She's had many many prism glasses but never found a pair that have worked great as she will develop migraines with the prism glasses. Her migraines are mainly headache and nausea. The second image is always up and to the right.     She been told she has an abnormal blood vessel in the left eye that was followed with serial scans/images.     She is here today to discuss options including surgery mainly to try to improve migraines and neck arthritis.     She thinks she is able to see 3D movies.     She also reports the left eye peripheral visual field  has been documented as restricted but has not noticed a change. This is longstanding.     Independent historians:  Patient    Review of outside testing:  None    My interpretation performed today of outside testing:  None     Review of outside clinical notes:    6/27/24  Visit with Dr. Bee            Past medical history:  Migraines, anxiety, depression    Patient has a current medication list which includes the following prescription(s): bupropion, ondansetron, ondansetron, and prednisolone acetate.. Imitrex. List is confirmed today.    Family history / social history:  Patient's family history is not on file.     Exam:  VA 20/20 each eye, pupils equal and reactive no APD, intraocular pressure 12 right eye and 13 left eye, color 11/11 each eye, anterior segment exam unremarkable, fundus exam with peripapillary loop left eye.     Tests ordered and interpreted today:  Sensorimotor exam showed a relatively concomitant pattern right hypertropia in all gaze positions at distance. Minimal torsion on double walker roger. No significant right inferior oblique overaction.     We discussed in detail the risks, benefits, and alternatives of eye muscle correction surgery including the very rare risk of death or serious morbidity from a general anesthesia complication and the rare risk of severe vision loss in the operative eye(s) secondary to retinal detachment or endophthalmitis.  We discussed more likely sub-optimal outcomes including the unanticipated need for additional strabismus surgery.  Finally the patient was aware that prisms glasses may be required to optimize single vision following surgery.  Patient aware that it is possible we will only reduce her dependency on prism but still need some for optimal fusion.    After a thorough discussion of these risks, the patient decided to proceed with strabismus surgery.  The surgical plan is as follows:     1. Bilateral eye muscle correction.    Fixed suture  technique    Follow-up 1 week after strabismus surgery.    Plan to operate at: [facility]  Prism free glasses for adjustment: Non-adjustable    Patient wishes for surgery in January.       Maxi Rodriguez MD  PGY-3 Ophthalmology Resident  [facility]

## 2025-02-19 ENCOUNTER — OFFICE VISIT (OUTPATIENT)
Dept: OBGYN | Facility: CLINIC | Age: 45
End: 2025-02-19
Attending: ADVANCED PRACTICE MIDWIFE
Payer: COMMERCIAL

## 2025-02-19 VITALS
SYSTOLIC BLOOD PRESSURE: 109 MMHG | HEART RATE: 69 BPM | WEIGHT: 156 LBS | BODY MASS INDEX: 25.07 KG/M2 | DIASTOLIC BLOOD PRESSURE: 74 MMHG | HEIGHT: 66 IN

## 2025-02-19 DIAGNOSIS — N95.1 PERIMENOPAUSE: Primary | ICD-10-CM

## 2025-02-19 DIAGNOSIS — Z97.5 IUD (INTRAUTERINE DEVICE) IN PLACE: ICD-10-CM

## 2025-02-19 PROCEDURE — 99213 OFFICE O/P EST LOW 20 MIN: CPT | Performed by: ADVANCED PRACTICE MIDWIFE

## 2025-02-19 RX ORDER — CYCLOBENZAPRINE HCL 10 MG
10 TABLET ORAL
COMMUNITY
Start: 2024-12-12

## 2025-02-19 RX ORDER — SUMATRIPTAN 50 MG/1
50 TABLET, FILM COATED ORAL
COMMUNITY
Start: 2025-01-31

## 2025-02-19 RX ORDER — DOXYCYCLINE 50 MG/1
CAPSULE ORAL
COMMUNITY
Start: 2024-01-29

## 2025-02-19 ASSESSMENT — ANXIETY QUESTIONNAIRES
GAD7 TOTAL SCORE: 3
4. TROUBLE RELAXING: SEVERAL DAYS
1. FEELING NERVOUS, ANXIOUS, OR ON EDGE: NOT AT ALL
7. FEELING AFRAID AS IF SOMETHING AWFUL MIGHT HAPPEN: NOT AT ALL
GAD7 TOTAL SCORE: 3
2. NOT BEING ABLE TO STOP OR CONTROL WORRYING: NOT AT ALL
3. WORRYING TOO MUCH ABOUT DIFFERENT THINGS: SEVERAL DAYS
5. BEING SO RESTLESS THAT IT IS HARD TO SIT STILL: NOT AT ALL
8. IF YOU CHECKED OFF ANY PROBLEMS, HOW DIFFICULT HAVE THESE MADE IT FOR YOU TO DO YOUR WORK, TAKE CARE OF THINGS AT HOME, OR GET ALONG WITH OTHER PEOPLE?: NOT DIFFICULT AT ALL
6. BECOMING EASILY ANNOYED OR IRRITABLE: SEVERAL DAYS
7. FEELING AFRAID AS IF SOMETHING AWFUL MIGHT HAPPEN: NOT AT ALL
IF YOU CHECKED OFF ANY PROBLEMS ON THIS QUESTIONNAIRE, HOW DIFFICULT HAVE THESE PROBLEMS MADE IT FOR YOU TO DO YOUR WORK, TAKE CARE OF THINGS AT HOME, OR GET ALONG WITH OTHER PEOPLE: NOT DIFFICULT AT ALL
GAD7 TOTAL SCORE: 3

## 2025-02-19 NOTE — NURSING NOTE
Chief Complaint   Patient presents with    Establish Care     C/O menopausal issues    Christi Payan LPN

## 2025-02-19 NOTE — LETTER
"2/19/2025       RE: Leonora Mauricio  [address] [facility] [address]     Dear Colleague,    Thank you for referring your patient, Leonora Mauricio, to the [facility] [place] at [facility]. Please see a copy of my visit note below.    Subjective:  Leonora Mauricio is an 44 year old, No obstetric history on file., who requests an evaluation of madhuri/menopause symptoms.      Madhuri/menopause symptoms include:   Wondering if she is starting to have perimenopause symptoms, not looking for treatment at the point.  Questions about tracking perimenopause symptoms and treatment options for the future    Up to date on annual exam and preventive  screening    Gynecologic History  Patient's last menstrual period was 12/23/2024 (approximate).       Current contraception: Mirena IUD  STI History: no      Obstetric History  OB History   No obstetric history on file.        Labs:  No results found for: \"TSH\"  No results found for: \"CHOL\"  No results found for: \"HDL\"  No results found for: \"LDL\"  No results found for: \"TRIG\"  No results found for: \"CHOLHDLRATIO\"      Past Medical History  No past medical history on file.    Past Surgical History  Past Surgical History:   Procedure Laterality Date     RECESSION RESECTION (REPAIR STRABISMUS) BILATERAL Bilateral 12/30/2024    Procedure: Bilateral Eye Muscle Correction;  Surgeon: Bakari Rodrigez MD;  Location:  OR       Medications  Current Outpatient Medications   Medication Sig Dispense Refill     buPROPion (WELLBUTRIN XL) 150 MG 24 hr tablet Take 150 mg by mouth every morning.       cyclobenzaprine (FLEXERIL) 10 MG tablet Take 10 mg by mouth.       doxycycline monohydrate (MONODOX) 50 MG capsule TAKE ONE CAPSULE BY MOUTH TWICE DAILY WITH FOOD AND WATER. SUPPLEMENT WITH PROBIOTICS.       ondansetron (ZOFRAN ODT) 4 MG ODT tab Take 1 tablet (4 mg) by mouth every 8 hours as needed " "for nausea. 10 tablet 0     prednisoLONE acetate (PRED FORTE) 1 % ophthalmic suspension Instill 1 drop into operative eye(s) four times a day.  Do NOT start drops until instructed by Surgeon. 10 mL 0     SUMAtriptan (IMITREX) 50 MG tablet Take 50 mg by mouth.       No current facility-administered medications for this visit.       Allergies   No Known Allergies    Family History  Family History   Problem Relation Age of Onset     Glaucoma Mother      Migraines Mother      Osteoporosis Mother 55        on meds     Hyperlipidemia Father         meds     Migraines Sister      Migraines Sister      Migraines Brother      Heart Failure Paternal Grandmother      Breast Cancer Maternal Aunt 45     Macular Degeneration No family hx of      No family history of,uterine, ovarian or colon cancer.  Distance fam hx of breast cancer, maternal aunt      Objective  EXAM:  Blood pressure 109/74, pulse 69, height 1.676 m (5' 6\"), weight 70.8 kg (156 lb), last menstrual period 12/23/2024. Body mass index is 25.18 kg/m .  General - pleasant female in no acute distress.    Musculoskeletal - no gross deformities.  Neurological - normal strength, sensation, and mental status.      ASSESSMENT:  Leonora Mauricio is an 44 year old, No obstetric history on file., who requests an evaluation of madhuri/menopause symptoms.    ICD-10-CM    1. Perimenopause  N95.1           PLAN:  No orders of the defined types were placed in this encounter.      -Reviewed risk and benefits of initiating systemic HT for bothersome VMS. Discussed tracking changes and symptoms,  -Discussed risk versus benefit with patient in initiating systemic   -Patient does not have any absolute contraindications Discussed medication regimen with patient utilizing cyclic or continuous progesterone.    -Reviewed use of vaginal lubrication for GSM.  Discussed GSM and VMS and treatment options  Systemic estrogen and progesterone IUD, and local vaginal estrogen    Multiple " menopause resources sent via [employer].   40 minutes spent on the date of the encounter doing chart review, history and exam, documentation and further activities per the note.    Renata Hemphill, PARTH, APRN, CNM, FACNM    Return to clinic  as needed.  BRANDON Zimmerman CNM    Answers submitted by the patient for this visit:  Patient Health Questionnaire (G7) (Submitted on 2/19/2025)  KONSTANTIN 7 TOTAL SCORE: 3      Again, thank you for allowing me to participate in the care of your patient.      Sincerely,    BRANDON Zimmerman CNM

## 2025-02-19 NOTE — PROGRESS NOTES
"Subjective:  Leonora Mauricio is an 44 year old, No obstetric history on file., who requests an evaluation of madhuri/menopause symptoms.      Madhuri/menopause symptoms include:   Wondering if she is starting to have perimenopause symptoms, not looking for treatment at the point.  Questions about tracking perimenopause symptoms and treatment options for the future    Up to date on annual exam and preventive  screening    Gynecologic History  Patient's last menstrual period was 12/23/2024 (approximate).       Current contraception: Mirena IUD  STI History: no      Obstetric History  OB History   No obstetric history on file.        Labs:  No results found for: \"TSH\"  No results found for: \"CHOL\"  No results found for: \"HDL\"  No results found for: \"LDL\"  No results found for: \"TRIG\"  No results found for: \"CHOLHDLRATIO\"      Past Medical History  No past medical history on file.    Past Surgical History  Past Surgical History:   Procedure Laterality Date    RECESSION RESECTION (REPAIR STRABISMUS) BILATERAL Bilateral 12/30/2024    Procedure: Bilateral Eye Muscle Correction;  Surgeon: Bakari Rodrigez MD;  Location:  OR       Medications  Current Outpatient Medications   Medication Sig Dispense Refill    buPROPion (WELLBUTRIN XL) 150 MG 24 hr tablet Take 150 mg by mouth every morning.      cyclobenzaprine (FLEXERIL) 10 MG tablet Take 10 mg by mouth.      doxycycline monohydrate (MONODOX) 50 MG capsule TAKE ONE CAPSULE BY MOUTH TWICE DAILY WITH FOOD AND WATER. SUPPLEMENT WITH PROBIOTICS.      ondansetron (ZOFRAN ODT) 4 MG ODT tab Take 1 tablet (4 mg) by mouth every 8 hours as needed for nausea. 10 tablet 0    prednisoLONE acetate (PRED FORTE) 1 % ophthalmic suspension Instill 1 drop into operative eye(s) four times a day.  Do NOT start drops until instructed by Surgeon. 10 mL 0    SUMAtriptan (IMITREX) 50 MG tablet Take 50 mg by mouth.       No current facility-administered medications for this visit. " "      Allergies   No Known Allergies    Family History  Family History   Problem Relation Age of Onset    Glaucoma Mother     Migraines Mother     Osteoporosis Mother 55        on meds    Hyperlipidemia Father         meds    Migraines Sister     Migraines Sister     Migraines Brother     Heart Failure Paternal Grandmother     Breast Cancer Maternal Aunt 45    Macular Degeneration No family hx of      No family history of,uterine, ovarian or colon cancer.  Distance fam hx of breast cancer, maternal aunt      Objective  EXAM:  Blood pressure 109/74, pulse 69, height 1.676 m (5' 6\"), weight 70.8 kg (156 lb), last menstrual period 12/23/2024. Body mass index is 25.18 kg/m .  General - pleasant female in no acute distress.    Musculoskeletal - no gross deformities.  Neurological - normal strength, sensation, and mental status.      ASSESSMENT:  Leonora Mauricio is an 44 year old, No obstetric history on file., who requests an evaluation of madhuri/menopause symptoms.    ICD-10-CM    1. Perimenopause  N95.1           PLAN:  No orders of the defined types were placed in this encounter.      -Reviewed risk and benefits of initiating systemic HT for bothersome VMS. Discussed tracking changes and symptoms,  -Discussed risk versus benefit with patient in initiating systemic   -Patient does not have any absolute contraindications Discussed medication regimen with patient utilizing cyclic or continuous progesterone.    -Reviewed use of vaginal lubrication for GSM.  Discussed GSM and VMS and treatment options  Systemic estrogen and progesterone IUD, and local vaginal estrogen    Multiple menopause resources sent via [employer].   40 minutes spent on the date of the encounter doing chart review, history and exam, documentation and further activities per the note.    Renata Hemphill, PARTH, APRN, CNM, FACNM    Return to clinic  as needed.  BRANDON Zimmerman CNM    Answers submitted by the patient for this visit:  Patient " Health Questionnaire (G7) (Submitted on 2/19/2025)  KONSTANTIN 7 TOTAL SCORE: 3

## 2025-02-19 NOTE — PATIENT INSTRUCTIONS
There are many places to find information on menopause, and it is always good to consider the source. Here are some books, videos, podcasts and websites that are by reputable sources    A book Hot and Bothered by Nadya Bales  A podcast Ailyn Hoff 'From PMS to Menopause: How to Hack Your Hormones'   The book The New Rules of Menopause; A [facility] Guide to Perimenopause and Beyond by  Dr Mary Paris  The Menopause Manifesto by Ramona Chu    Many people ask about the book The New Menopause By Dr Melissa Cordero.  I think she does a nice job describing what happens to the body during menopause and menopause hormone therapy, but she also recommends many expensive tests and expensive supplements that are not part of the standard guidelines in menopause treatment    Videos:    The Menopause Society, a trusted source in menopause care and knowledge, has a Youtube channel with videos on many topics related to menopause: https://www.youGotoTel.com/@MenopauseSociety  What is Menopause? By the National Institutes of Health ([facility]): https://Pelamis Wave Power.be/af-356SbCkY     Podcasts:    Ailyn Hoff 'From PMS to Menopause: How to Hack Your Hormones'   The International Menopause Society Podcast, created by the society of the same name, has episodes on many topics related to menopause.     Websites:  National Institutes of Health (NIH): https://magazine.medlineplus.gov/article/menopause-what-you-need-to-know/  The Menopause Society: https://menopause.org/patient-education     Things you can do today to feel good    Drink at least 64 ounces of water a day.  Staying hydrated helps your body run smoother, dehydration increases headaches and fatigue   Eat 5 servings of produce a day  Real, unprocessed foods like produce are high in fiber and natural vitamins and minerals.  Also if you are getting 5 servings you will have less craving and less room for unhealthy  processed foods   Floss you teeth If you do not floss there are many  little micro infections in your gums stimulating inflammation   Move / exercise  every day Start with walking like you are late for 20 mins a day, add strength training twice a week   Limit sugar and alcohol Both have extra calories and stimulate inflammation   Manage stress Find what works for you, meditation, yoga, go for a walk, talk to a trusted friend, sing, laugh, pet a dog         Many people ask about supplements.  You will get better nutrition from eating a variety of real unprocessed foods than from any supplements.  But these are the supplements that I think can be helpful during the perimenopause/menopause transition    Vitamin D3, 2,000 international unit(s) every day  Magnesium oxide or magnesium glycinate 400-500 mg every night before bed (if it causes diarrhea switch to another form or another brand)   Fish oil / omega 3 2,000 - 2,400 mg / day       Thank you for trusting us with your care!   Please be aware, if you are on Mychart, you may see your results prior to your providers review. If labs are abnormal, we will call or message you on [employer] with a follow up plan.    If you need to contact us for questions about:  Symptoms, Scheduling & Medical Questions; Non-urgent (2-3 day response) [employer] message, Urgent (needing response today) [phone] (if after 3:30pm next day response)   Prescriptions: Please call your Pharmacy   Billing: [place] [phone] or [facility]:[phone]

## 2025-05-21 ENCOUNTER — OFFICE VISIT (OUTPATIENT)
Dept: OPHTHALMOLOGY | Facility: CLINIC | Age: 45
End: 2025-05-21
Attending: OPHTHALMOLOGY
Payer: COMMERCIAL

## 2025-05-21 DIAGNOSIS — H53.2 DOUBLE VISION: Primary | ICD-10-CM

## 2025-05-21 DIAGNOSIS — H50.21 HYPERTROPIA OF RIGHT EYE: ICD-10-CM

## 2025-05-21 PROCEDURE — 99213 OFFICE O/P EST LOW 20 MIN: CPT | Performed by: OPHTHALMOLOGY

## 2025-05-21 ASSESSMENT — EXTERNAL EXAM - LEFT EYE: OS_EXAM: NORMAL

## 2025-05-21 ASSESSMENT — CONF VISUAL FIELD
OD_INFERIOR_TEMPORAL_RESTRICTION: 0
METHOD: COUNTING FINGERS
OD_SUPERIOR_TEMPORAL_RESTRICTION: 0
OS_SUPERIOR_TEMPORAL_RESTRICTION: 3
OS_INFERIOR_TEMPORAL_RESTRICTION: 3
OD_SUPERIOR_NASAL_RESTRICTION: 0
OD_INFERIOR_NASAL_RESTRICTION: 0
OD_NORMAL: 1

## 2025-05-21 ASSESSMENT — TONOMETRY
OS_IOP_MMHG: 18
IOP_METHOD: ICARE
OD_IOP_MMHG: 18

## 2025-05-21 ASSESSMENT — SLIT LAMP EXAM - LIDS
COMMENTS: NORMAL
COMMENTS: NORMAL

## 2025-05-21 ASSESSMENT — VISUAL ACUITY
OD_SC: 20/20
METHOD: SNELLEN - LINEAR
OS_SC: 20/20

## 2025-05-21 ASSESSMENT — EXTERNAL EXAM - RIGHT EYE: OD_EXAM: NORMAL

## 2025-05-21 NOTE — LETTER
May 21, 2025    RE: Leonora Mauricio  : 1980  MRN: [id number]    Dear Providers,    I saw our mutual patient, Leonora Mauricio, in follow-up in my clinic recently.  After a thorough neuro-ophthalmic history and examination, I came to the following conclusions:     1. Right superior oblique palsy, congenital:      Status post strabismus surgery. Patient had a tremendous worsening of her migraines and nausea after strabismus surgery but this has since resolved and her alignment today is outstanding. She has virtually no detectable strabismus in primary gaze.     2. 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     Follow-up as needed with neuro-ophthalmology. Resume routine eye care with primary eye care provider.    -Surgery:    PREOPERATIVE DIAGNOSIS (ES):  Right hypertropia   Alternating exotropia      POSTOPERATIVE DIAGNOSIS (ES):  Same     NAME OF OPERATION:  Right superior rectus recession 3.5 mm  Left lateral rectus recession 4.5 mm     Interval HPI, exam, and data since last visit on 2025:  Patient was last seen on 2025 for 6 week post op visit from her surgery on 24 involving right superior rectus recession (3.5mm) and left lateral rectus recession (4.5mm).           At the last visit, patient had been struggling with nausea and migraines triggered by double vision that worsened with activities like video calls. She was seen by a neurologist for migraines, which she reported having 10-12 migraine days per month. She had migraines since her teenage years but they have worsened around the age of 41. Neurologist prescribed sumatriptan for abortive therapy, compazine for nausea. Robaxin for neck pain, and vitamin D. The patient's symptoms started to improve and she used her medications as needed.    Today, patient reports no eye pain or strain. Double vision has greatly improved only noticeable on left gaze. She feels like the quality of the vision is  better, digital content, mainly at distance, is not as crisp as physical content. She is off all drops and ointment. She uses her sumatriptan once every 4-6 weeks, compazine about once every 4-6 weeks for nausea, and robaxin every 2-4 weeks.     Imaging:  MRI Brain wo contrast 2/26/2025  Impression  No significant intracranial abnormality evident.    Neurologist is Mary Caraballo NP- [facility] 1/31/25    -Will plan for MRI brain to rule out any structural abnormality such as tumor or mass.   -For prophylactic therapy will start Migrelief daily. Since her surgery, she has had an increase in headache frequency. May consider Cefaly device.   -For abortive therapy will increase Sumatriptan from 25 mg to 50 mg PRN daily. Do not take more than 2 pills per week.   -Nausea: Will start Compazine 5 mg PRN. Zofran made her constipated.   -Neck pain: Will start Robaxin 500 mg PRN. Flexeril was not helpful.   -Low Vitamin D level: Start Vitamin D 2,000 iu daily.  -Recommended life style changes of regular meals, scheduled sleep and regular exercise of 40  minutes 3 times a week and staying well hydrated.  -Follow up in 3 months.    Thank you very much Dr. Pricsa Bang for requesting your neurology consultation. A copy will be sent to you.      Exam:  Please see epic chart for complete exam     For further exam details, please feel free to contact our office for additional records.  If you wish to contact me regarding this patient please email me at [email or web address] or give my clinic a call to arrange a phone conversation.    Sincerely,    Bakari Rodrigez MD  , Neuro-Ophthalmology and Adult Strabismus Surgery  [facility] in Neuro-Ophthalmology  Department of Ophthalmology and Visual Neurosciences  [facility]

## (undated) DEVICE — ESU HOLSTER PLASTIC DISP E2400

## (undated) DEVICE — COVER CAMERA IN-LIGHT DISP LT-C02

## (undated) DEVICE — EYE MARKING PAD 581057

## (undated) DEVICE — ESU CORD BIPOLAR GREEN 10-4000

## (undated) DEVICE — DRSG STERI STRIP 1/2X4" R1547

## (undated) DEVICE — EYE PREP BETADINE 5% SOLUTION 30ML 0065-0411-30

## (undated) DEVICE — DRAPE SPLIT SHEET 77X108 REINFORCED 29436

## (undated) DEVICE — POSITIONER ARMBOARD FOAM 1PAIR LF FP-ARMB1

## (undated) DEVICE — SOL WATER IRRIG 1000ML BOTTLE 2F7114

## (undated) DEVICE — SU VICRYL 6-0 S-29 12" J556G

## (undated) DEVICE — SU PLAIN 6-0 G-1DA 18" 770G

## (undated) DEVICE — STRAP KNEE/BODY 31143004

## (undated) DEVICE — PACK MINOR EYE

## (undated) DEVICE — LINEN TOWEL PACK X5 5464

## (undated) DEVICE — GLOVE BIOGEL PI MICRO SZ 7.5 48575

## (undated) RX ORDER — HYDROMORPHONE HYDROCHLORIDE 1 MG/ML
INJECTION, SOLUTION INTRAMUSCULAR; INTRAVENOUS; SUBCUTANEOUS
Status: DISPENSED
Start: 2024-12-30

## (undated) RX ORDER — SCOPOLAMINE 1 MG/3D
PATCH, EXTENDED RELEASE TRANSDERMAL
Status: DISPENSED
Start: 2024-12-30

## (undated) RX ORDER — HALOPERIDOL 5 MG/ML
INJECTION INTRAMUSCULAR
Status: DISPENSED
Start: 2024-12-30

## (undated) RX ORDER — PROPOFOL 10 MG/ML
INJECTION, EMULSION INTRAVENOUS
Status: DISPENSED
Start: 2024-12-30

## (undated) RX ORDER — GABAPENTIN 300 MG/1
CAPSULE ORAL
Status: DISPENSED
Start: 2024-12-30

## (undated) RX ORDER — FENTANYL CITRATE 50 UG/ML
INJECTION, SOLUTION INTRAMUSCULAR; INTRAVENOUS
Status: DISPENSED
Start: 2024-12-30

## (undated) RX ORDER — KETOROLAC TROMETHAMINE 30 MG/ML
INJECTION, SOLUTION INTRAMUSCULAR; INTRAVENOUS
Status: DISPENSED
Start: 2024-12-30

## (undated) RX ORDER — GLYCOPYRROLATE 0.2 MG/ML
INJECTION, SOLUTION INTRAMUSCULAR; INTRAVENOUS
Status: DISPENSED
Start: 2024-12-30

## (undated) RX ORDER — ONDANSETRON 2 MG/ML
INJECTION INTRAMUSCULAR; INTRAVENOUS
Status: DISPENSED
Start: 2024-12-30

## (undated) RX ORDER — DEXAMETHASONE SODIUM PHOSPHATE 4 MG/ML
INJECTION, SOLUTION INTRA-ARTICULAR; INTRALESIONAL; INTRAMUSCULAR; INTRAVENOUS; SOFT TISSUE
Status: DISPENSED
Start: 2024-12-30

## (undated) RX ORDER — ACETAMINOPHEN 325 MG/1
TABLET ORAL
Status: DISPENSED
Start: 2024-12-30